# Patient Record
Sex: MALE | Race: OTHER | NOT HISPANIC OR LATINO
[De-identification: names, ages, dates, MRNs, and addresses within clinical notes are randomized per-mention and may not be internally consistent; named-entity substitution may affect disease eponyms.]

---

## 2017-04-17 PROBLEM — Z00.00 ENCOUNTER FOR PREVENTIVE HEALTH EXAMINATION: Status: ACTIVE | Noted: 2017-04-17

## 2017-04-18 ENCOUNTER — APPOINTMENT (OUTPATIENT)
Dept: OTOLARYNGOLOGY | Facility: CLINIC | Age: 27
End: 2017-04-18

## 2017-04-18 VITALS
SYSTOLIC BLOOD PRESSURE: 128 MMHG | HEIGHT: 68 IN | BODY MASS INDEX: 32.58 KG/M2 | DIASTOLIC BLOOD PRESSURE: 84 MMHG | WEIGHT: 215 LBS | HEART RATE: 60 BPM

## 2017-04-18 DIAGNOSIS — R22.1 LOCALIZED SWELLING, MASS AND LUMP, NECK: ICD-10-CM

## 2017-04-18 DIAGNOSIS — Z78.9 OTHER SPECIFIED HEALTH STATUS: ICD-10-CM

## 2017-04-18 DIAGNOSIS — K11.20 SIALOADENITIS, UNSPECIFIED: ICD-10-CM

## 2017-04-20 ENCOUNTER — APPOINTMENT (OUTPATIENT)
Dept: ULTRASOUND IMAGING | Facility: CLINIC | Age: 27
End: 2017-04-20

## 2017-04-20 ENCOUNTER — OUTPATIENT (OUTPATIENT)
Dept: OUTPATIENT SERVICES | Facility: HOSPITAL | Age: 27
LOS: 1 days | End: 2017-04-20
Payer: COMMERCIAL

## 2017-04-20 DIAGNOSIS — K11.20 SIALOADENITIS, UNSPECIFIED: ICD-10-CM

## 2017-04-20 PROCEDURE — 76536 US EXAM OF HEAD AND NECK: CPT

## 2017-05-08 ENCOUNTER — RESULT REVIEW (OUTPATIENT)
Age: 27
End: 2017-05-08

## 2020-11-01 ENCOUNTER — EMERGENCY (EMERGENCY)
Facility: HOSPITAL | Age: 30
LOS: 1 days | Discharge: HOME | End: 2020-11-01
Attending: EMERGENCY MEDICINE
Payer: COMMERCIAL

## 2020-11-01 VITALS
RESPIRATION RATE: 20 BRPM | OXYGEN SATURATION: 98 % | SYSTOLIC BLOOD PRESSURE: 136 MMHG | TEMPERATURE: 98 F | HEART RATE: 88 BPM | DIASTOLIC BLOOD PRESSURE: 89 MMHG

## 2020-11-01 VITALS
DIASTOLIC BLOOD PRESSURE: 87 MMHG | TEMPERATURE: 99 F | WEIGHT: 205.03 LBS | SYSTOLIC BLOOD PRESSURE: 150 MMHG | OXYGEN SATURATION: 97 % | HEIGHT: 68 IN | HEART RATE: 97 BPM | RESPIRATION RATE: 20 BRPM

## 2020-11-01 LAB
ALBUMIN SERPL ELPH-MCNC: 4.7 G/DL — SIGNIFICANT CHANGE UP (ref 3.3–5)
ALP SERPL-CCNC: 59 U/L — SIGNIFICANT CHANGE UP (ref 40–120)
ALT FLD-CCNC: 19 U/L — SIGNIFICANT CHANGE UP (ref 10–45)
ANION GAP SERPL CALC-SCNC: 11 MMOL/L — SIGNIFICANT CHANGE UP (ref 5–17)
AST SERPL-CCNC: 15 U/L — SIGNIFICANT CHANGE UP (ref 10–40)
BASOPHILS # BLD AUTO: 0.02 K/UL — SIGNIFICANT CHANGE UP (ref 0–0.2)
BASOPHILS NFR BLD AUTO: 0.3 % — SIGNIFICANT CHANGE UP (ref 0–2)
BILIRUB SERPL-MCNC: 0.7 MG/DL — SIGNIFICANT CHANGE UP (ref 0.2–1.2)
BUN SERPL-MCNC: 9 MG/DL — SIGNIFICANT CHANGE UP (ref 7–23)
CALCIUM SERPL-MCNC: 9.6 MG/DL — SIGNIFICANT CHANGE UP (ref 8.4–10.5)
CHLORIDE SERPL-SCNC: 101 MMOL/L — SIGNIFICANT CHANGE UP (ref 96–108)
CO2 SERPL-SCNC: 27 MMOL/L — SIGNIFICANT CHANGE UP (ref 22–31)
CREAT SERPL-MCNC: 0.91 MG/DL — SIGNIFICANT CHANGE UP (ref 0.5–1.3)
EOSINOPHIL # BLD AUTO: 0.03 K/UL — SIGNIFICANT CHANGE UP (ref 0–0.5)
EOSINOPHIL NFR BLD AUTO: 0.4 % — SIGNIFICANT CHANGE UP (ref 0–6)
GLUCOSE SERPL-MCNC: 103 MG/DL — HIGH (ref 70–99)
HCT VFR BLD CALC: 48.3 % — SIGNIFICANT CHANGE UP (ref 39–50)
HGB BLD-MCNC: 16.9 G/DL — SIGNIFICANT CHANGE UP (ref 13–17)
IMM GRANULOCYTES NFR BLD AUTO: 0.3 % — SIGNIFICANT CHANGE UP (ref 0–1.5)
LIDOCAIN IGE QN: 12 U/L — SIGNIFICANT CHANGE UP (ref 7–60)
LYMPHOCYTES # BLD AUTO: 0.97 K/UL — LOW (ref 1–3.3)
LYMPHOCYTES # BLD AUTO: 14.4 % — SIGNIFICANT CHANGE UP (ref 13–44)
MCHC RBC-ENTMCNC: 30.5 PG — SIGNIFICANT CHANGE UP (ref 27–34)
MCHC RBC-ENTMCNC: 35 GM/DL — SIGNIFICANT CHANGE UP (ref 32–36)
MCV RBC AUTO: 87.2 FL — SIGNIFICANT CHANGE UP (ref 80–100)
MONOCYTES # BLD AUTO: 0.87 K/UL — SIGNIFICANT CHANGE UP (ref 0–0.9)
MONOCYTES NFR BLD AUTO: 12.9 % — SIGNIFICANT CHANGE UP (ref 2–14)
NEUTROPHILS # BLD AUTO: 4.81 K/UL — SIGNIFICANT CHANGE UP (ref 1.8–7.4)
NEUTROPHILS NFR BLD AUTO: 71.7 % — SIGNIFICANT CHANGE UP (ref 43–77)
NRBC # BLD: 0 /100 WBCS — SIGNIFICANT CHANGE UP (ref 0–0)
PLATELET # BLD AUTO: 234 K/UL — SIGNIFICANT CHANGE UP (ref 150–400)
POTASSIUM SERPL-MCNC: 3.8 MMOL/L — SIGNIFICANT CHANGE UP (ref 3.5–5.3)
POTASSIUM SERPL-SCNC: 3.8 MMOL/L — SIGNIFICANT CHANGE UP (ref 3.5–5.3)
PROT SERPL-MCNC: 7 G/DL — SIGNIFICANT CHANGE UP (ref 6–8.3)
RBC # BLD: 5.54 M/UL — SIGNIFICANT CHANGE UP (ref 4.2–5.8)
RBC # FLD: 12.3 % — SIGNIFICANT CHANGE UP (ref 10.3–14.5)
SODIUM SERPL-SCNC: 139 MMOL/L — SIGNIFICANT CHANGE UP (ref 135–145)
WBC # BLD: 6.72 K/UL — SIGNIFICANT CHANGE UP (ref 3.8–10.5)
WBC # FLD AUTO: 6.72 K/UL — SIGNIFICANT CHANGE UP (ref 3.8–10.5)

## 2020-11-01 PROCEDURE — 99284 EMERGENCY DEPT VISIT MOD MDM: CPT | Mod: 25

## 2020-11-01 PROCEDURE — 76705 ECHO EXAM OF ABDOMEN: CPT

## 2020-11-01 PROCEDURE — 76705 ECHO EXAM OF ABDOMEN: CPT | Mod: 26,RT

## 2020-11-01 PROCEDURE — 99285 EMERGENCY DEPT VISIT HI MDM: CPT

## 2020-11-01 PROCEDURE — 80053 COMPREHEN METABOLIC PANEL: CPT

## 2020-11-01 PROCEDURE — 96374 THER/PROPH/DIAG INJ IV PUSH: CPT

## 2020-11-01 PROCEDURE — 85025 COMPLETE CBC W/AUTO DIFF WBC: CPT

## 2020-11-01 PROCEDURE — 83690 ASSAY OF LIPASE: CPT

## 2020-11-01 RX ORDER — ACETAMINOPHEN 500 MG
650 TABLET ORAL ONCE
Refills: 0 | Status: COMPLETED | OUTPATIENT
Start: 2020-11-01 | End: 2020-11-01

## 2020-11-01 RX ORDER — FAMOTIDINE 10 MG/ML
20 INJECTION INTRAVENOUS ONCE
Refills: 0 | Status: COMPLETED | OUTPATIENT
Start: 2020-11-01 | End: 2020-11-01

## 2020-11-01 RX ORDER — SODIUM CHLORIDE 9 MG/ML
1000 INJECTION INTRAMUSCULAR; INTRAVENOUS; SUBCUTANEOUS ONCE
Refills: 0 | Status: COMPLETED | OUTPATIENT
Start: 2020-11-01 | End: 2020-11-01

## 2020-11-01 RX ADMIN — Medication 650 MILLIGRAM(S): at 22:40

## 2020-11-01 RX ADMIN — Medication 20 MILLIGRAM(S): at 22:44

## 2020-11-01 RX ADMIN — FAMOTIDINE 20 MILLIGRAM(S): 10 INJECTION INTRAVENOUS at 20:01

## 2020-11-01 RX ADMIN — Medication 650 MILLIGRAM(S): at 20:01

## 2020-11-01 RX ADMIN — SODIUM CHLORIDE 1000 MILLILITER(S): 9 INJECTION INTRAMUSCULAR; INTRAVENOUS; SUBCUTANEOUS at 20:01

## 2020-11-01 NOTE — ED PROVIDER NOTE - PATIENT PORTAL LINK FT
You can access the FollowMyHealth Patient Portal offered by [facility] by registering at the following website: [email or web address]. By joining [facility] FollowMyHealth portal, you will also be able to view your health information using other applications (apps) compatible with our system.

## 2020-11-01 NOTE — ED PROVIDER NOTE - NSFOLLOWUPINSTRUCTIONS_ED_ALL_ED_FT
Thank you for visiting our Emergency Department, it has been a pleasure taking part in your healthcare.    Please follow up with your Primary Doctor in 2-3 days.    Tylenol 650mg every 4 hours as needed for pain. Pepcid 20mg once a day.      Abdominal Pain  Many things can cause abdominal pain. Many times, abdominal pain is not caused by a disease and will improve without treatment. Your health care provider will do a physical exam to determine if there is a dangerous cause of your pain; blood tests and imaging may help determine the cause of your pain. However, in many cases, no cause may be found and you may need further testing as an outpatient. Monitor your abdominal pain for any changes.     SEEK IMMEDIATE MEDICAL CARE IF YOU HAVE ANY OF THE FOLLOWING SYMPTOMS: worsening abdominal pain, uncontrollable vomiting, profuse diarrhea, inability to have bowel movements or pass gas, black or bloody stools, fever accompanying chest pain or back pain, or fainting. These symptoms may represent a serious problem that is an emergency. Do not wait to see if the symptoms will go away. Get medical help right away. Call 911 and do not drive yourself to the hospital.

## 2020-11-01 NOTE — ED PROVIDER NOTE - CLINICAL SUMMARY MEDICAL DECISION MAKING FREE TEXT BOX
Resident: Kimberley Flores - 31 yo healthy male c/o epigastric abdominal pain most likely from viral/food-borne gastroenteritis. Other ddx to consider for this patient include pancreatitis vs cholecystitis given epigastric TTP. Labs, IVF, RUQ ultrasound, pain control with tylenol and pepcid. Anticipate d/c home if labs and imaging nl. Resident: Kimberley Flores - 29 yo healthy male c/o epigastric abdominal pain most likely from viral/food-borne gastroenteritis. Other ddx to consider for this patient include pancreatitis vs cholecystitis given epigastric TTP. Labs, IVF, RUQ ultrasound, pain control with tylenol and pepcid. Anticipate d/c home if labs and imaging nl.    Hilda Shaw MD - Attending Physician: Pt ehre with 2 days of epigastric pain, bloating, diarrhea, chills. No fever. Well appearing. Exam benign, though mildly tender. Likely AGE vs GI cramping. Low concern for GB or pancreatitis, but will check labs/US. Meds for symptoms Resident: Kimberley Flores - 29 yo healthy male c/o epigastric abdominal pain most likely from viral/food-borne gastroenteritis. Other ddx to consider for this patient include pancreatitis vs cholecystitis given epigastric TTP. Labs, IVF, RUQ ultrasound, pain control with tylenol and pepcid. Anticipate d/c home if labs and imaging nl.    Hilda Shaw MD - Attending Physician: Pt here with 2 days of epigastric pain, bloating, diarrhea, chills. No fever. Well appearing. Exam benign, though mildly tender. Likely AGE vs GI cramping. Low concern for GB or pancreatitis, but will check labs/US. Meds for symptoms

## 2020-11-01 NOTE — ED PROVIDER NOTE - OBJECTIVE STATEMENT
31 yo M hx asthma, c/o epigastric abdominal pain after eating chinese food. Pain started yesterday PM with gradual onset, described as tightness and rated 6/10. Patient had chinese food 2 days ago, the next day he started having loss of appetite and c/o chills. Took his temperature, did not have a fever. He tried taking tylenol yesterday with no relief of his pain, did not take any today. Denies any n/v. Had 1x episode diarrhea today. Feels bloated. Denies any hx abdominal surgeries. Denies SOB, c/p, dysuria, urinary retention. Drinks alcohol socially, has not had a drink in a couple of weeks. 29 yo M hx asthma, c/o epigastric abdominal pain after eating chinese food. Pain started yesterday PM with gradual onset, described as tightness and rated 6/10. Patient had chinese food 2 days ago, the next day he started having loss of appetite and c/o chills. Took his temperature, did not have a fever. He tried taking tylenol yesterday with no relief of his pain, did not take any today. Denies any n/v. Had 1x episode diarrhea today. Feels bloated. Denies any hx abdominal surgeries. No recent abx use. Denies SOB, c/p, dysuria, urinary retention. Drinks alcohol socially, has not had a drink in a couple of weeks.

## 2020-11-01 NOTE — ED PROVIDER NOTE - PROGRESS NOTE DETAILS
US reviewed with Rads. No GB wall thickening, normal CBD, neg [place]. Pt remains well, abd benign, labs unremarkable. Will Dc, supportive care at home. F/u with pmd. Return precautions discussed Sandra, PGY1 – Pt was re-evaluated at bedside, VSS, feeling well overall. Return precautions and follow up plan with PCP and/or specialist were discussed. Time was taken to answer any questions that the patient had before providing them with discharge paperwork.

## 2020-11-01 NOTE — ED ADULT NURSE NOTE - OBJECTIVE STATEMENT
PT 30 year old male, AO x3. PT came in through triage due to abdominal pain. PMH- Asthma. Denies PSH. PT states two days ago he ate chinese food. PT states since yesterday, he developed epigastric pain and abdominal distension. PT also complaining of diarrhea, back chills and decrease PO intake due to abdominal discomfort. PT describes pain as twisting and states it increases when sitting or "hunched over". PT stated he ate nothing today and tolerate fluids. Abd. firm, distended, tender. Skin- warm, dry, intact. Ambulates independently. Denies SOB, chest pain, fever, N/V, dysuria, dizziness/ lightheadedness, numbness/ tingling, cough. Interventions- side rails up, call bell at bedside. IV- 18 RT AC. PT 30 year old male, AO x3. PT came in through triage due to abdominal pain. PMH- Asthma. Denies PSH. PT states two days ago he ate chinese food. PT states since yesterday, he developed epigastric pain and abdominal distension. PT also complaining of diarrhea, back chills and decrease PO intake due to abdominal discomfort. PT describes pain as twisting and states it increases when sitting or "hunched over". PT stated he had 2-3 episodes of watery stools, ate nothing today but able to tolerate fluids. Abd. firm, distended, tender. Skin- warm, dry, intact. Ambulates independently. Denies SOB, chest pain, fever, N/V, dysuria, dizziness/ lightheadedness, numbness/ tingling, cough. Interventions- side rails up, call bell at bedside. IV- 18 RT AC.

## 2020-11-01 NOTE — ED PROVIDER NOTE - PHYSICAL EXAMINATION
Gen: NAD; well appearing  Head: NCAT  Eyes: EOMI, PERRLA, no conjunctival pallor, no scleral icterus  ENT: mucous membranes moist, no discharge  Neck: neck supple  Resp: CTAB, no W/R/R  CV: RRR, +S1/S2, no M/R/G  GI: Abdomen soft, mild distension. TTP epigastric   MSK: No open wounds, no bruising, no lower extremity edema  Neuro: A&Ox4, sensation nl, motor 5/5 RUE/LUE/RLE/LLE, follows commands  Ext: no edema, no deformity, warm and well-perfused  Skin: no rash or bruising Gen: NAD; well appearing  Head: NCAT  Eyes: EOMI, PERRLA, no conjunctival pallor, no scleral icterus  ENT: mucous membranes moist, no discharge  Neck: neck supple  Resp: CTAB, no W/R/R  CV: RRR, +S1/S2, no M/R/G  GI: Abdomen soft, nondistended. mild distractible TTP epigastrium no rebound/guarding  MSK: No open wounds, no bruising, no lower extremity edema  Neuro: A&Ox4, sensation nl, motor 5/5 RUE/LUE/RLE/LLE, follows commands  Ext: no edema, no deformity, warm and well-perfused  Skin: no rash or bruising

## 2021-06-09 ENCOUNTER — APPOINTMENT (OUTPATIENT)
Dept: DISASTER EMERGENCY | Facility: CLINIC | Age: 31
End: 2021-06-09

## 2021-06-09 DIAGNOSIS — Z01.818 ENCOUNTER FOR OTHER PREPROCEDURAL EXAMINATION: ICD-10-CM

## 2021-06-09 PROBLEM — Z00.00 ENCOUNTER FOR PREVENTIVE HEALTH EXAMINATION: Noted: 2021-06-09

## 2021-06-10 LAB — SARS-COV-2 N GENE NPH QL NAA+PROBE: NOT DETECTED

## 2021-08-16 PROBLEM — J45.909 UNSPECIFIED ASTHMA, UNCOMPLICATED: Chronic | Status: ACTIVE | Noted: 2020-11-01

## 2023-07-05 ENCOUNTER — EMERGENCY (EMERGENCY)
Facility: HOSPITAL | Age: 33
LOS: 1 days | Discharge: HOME | End: 2023-07-05
Admitting: EMERGENCY MEDICINE
Payer: OTHER MISCELLANEOUS

## 2023-07-05 VITALS
SYSTOLIC BLOOD PRESSURE: 172 MMHG | DIASTOLIC BLOOD PRESSURE: 115 MMHG | HEART RATE: 95 BPM | WEIGHT: 210.1 LBS | TEMPERATURE: 98 F | OXYGEN SATURATION: 98 % | RESPIRATION RATE: 16 BRPM

## 2023-07-05 VITALS
DIASTOLIC BLOOD PRESSURE: 88 MMHG | OXYGEN SATURATION: 99 % | HEART RATE: 88 BPM | RESPIRATION RATE: 16 BRPM | TEMPERATURE: 98 F | SYSTOLIC BLOOD PRESSURE: 144 MMHG

## 2023-07-05 PROCEDURE — 99053 MED SERV 10PM-8AM 24 HR FAC: CPT

## 2023-07-05 PROCEDURE — 99284 EMERGENCY DEPT VISIT MOD MDM: CPT

## 2023-07-05 RX ORDER — MOXIFLOXACIN HCL 0.5 %
1 DROPS OPHTHALMIC (EYE)
Qty: 1 | Refills: 0
Start: 2023-07-05 | End: 2023-07-11

## 2023-07-05 RX ORDER — ACETAMINOPHEN 500 MG
975 TABLET ORAL ONCE
Refills: 0 | Status: COMPLETED | OUTPATIENT
Start: 2023-07-05 | End: 2023-07-05

## 2023-07-05 RX ORDER — IBUPROFEN 200 MG
800 TABLET ORAL ONCE
Refills: 0 | Status: COMPLETED | OUTPATIENT
Start: 2023-07-05 | End: 2023-07-05

## 2023-07-05 RX ORDER — IBUPROFEN 200 MG
1 TABLET ORAL
Qty: 20 | Refills: 0
Start: 2023-07-05

## 2023-07-05 RX ORDER — TETANUS TOXOID, REDUCED DIPHTHERIA TOXOID AND ACELLULAR PERTUSSIS VACCINE, ADSORBED 5; 2.5; 8; 8; 2.5 [IU]/.5ML; [IU]/.5ML; UG/.5ML; UG/.5ML; UG/.5ML
0.5 SUSPENSION INTRAMUSCULAR ONCE
Refills: 0 | Status: COMPLETED | OUTPATIENT
Start: 2023-07-05 | End: 2023-07-05

## 2023-07-05 RX ORDER — MOXIFLOXACIN HCL 0.5 %
1 DROPS OPHTHALMIC (EYE) ONCE
Refills: 0 | Status: COMPLETED | OUTPATIENT
Start: 2023-07-05 | End: 2023-07-05

## 2023-07-05 RX ADMIN — Medication 800 MILLIGRAM(S): at 03:10

## 2023-07-05 RX ADMIN — TETANUS TOXOID, REDUCED DIPHTHERIA TOXOID AND ACELLULAR PERTUSSIS VACCINE, ADSORBED 0.5 MILLILITER(S): 5; 2.5; 8; 8; 2.5 SUSPENSION INTRAMUSCULAR at 03:11

## 2023-07-05 RX ADMIN — Medication 1 DROP(S): at 03:19

## 2023-07-05 RX ADMIN — Medication 975 MILLIGRAM(S): at 03:10

## 2023-07-05 NOTE — ED PROVIDER NOTE - NSFOLLOWUPCLINICS_GEN_ALL_ED_FT
[facility]  Ophthalmology  [address] [place] [id number]  Phone: [phone]  Fax:     [facility]  ENT  [address] [place] [address]  Phone: [phone]  Fax: [phone]
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
## 2023-07-05 NOTE — ED PROVIDER NOTE - PHYSICAL EXAMINATION
Gen - WDWN, NAD, comfortable and non-toxic appearing  Skin - warm, dry, intact   HEENT - AT/NC, +mild conjunctival injection with clear dc, no nasal discharge, airway patent, TM intact with no fluids or hemotympanium b/l, no mastoid tenderness, neck supple and FROM  Eye - PERRL, EOMI, mild conjunctival injection, no active d/c or bleeding, no FB noted, +florescein uptake noted in 7-8 o'clock direction, no dendritic pattern or rust ring noted, negative coleen sign, visual fields intact  vision - 20/30 OD. 20/20 OS, 20/20 OU without correction. pH 7.0 OU   CV - S1S2, R/R/R  Resp - CTAB, no r/r/w  GI - soft, ND, NT, no CVAT b/l   MS - No acute or gross deformities noted to extremities. No midline spinal tenderness or step off on palpation  Neuro - AxOx3, no focal neuro deficits, ambulatory without gait disturbance

## 2023-07-05 NOTE — ED ADULT NURSE NOTE - NSFALLUNIVINTERV_ED_ALL_ED
Bed/Stretcher in lowest position, wheels locked, appropriate side rails in place/Call bell, personal items and telephone in reach/Instruct patient to call for assistance before getting out of bed/chair/stretcher/Non-slip footwear applied when patient is off stretcher/[place] to call system/Physically safe environment - no spills, clutter or unnecessary equipment/Purposeful proactive rounding/Room/bathroom lighting operational, light cord in reach

## 2023-07-05 NOTE — ED PROVIDER NOTE - NSFOLLOWUPINSTRUCTIONS_ED_ALL_ED_FT
Chemical Conjunctivitis, Adult    Chemical conjunctivitis, or chemical pink eye, is inflammation of the conjunctiva caused by a chemical irritant. The conjunctiva is the clear covering of the white part of the eye and the inner eyelid. The condition makes the eye red, pink, and itchy.    This condition can occur in one eye or both eyes. It cannot spread from person to person (is not contagious).    Certain chemicals, especially strong acids or bases, can cause severe damage to the eye, occasionally leading to blindness in the eye.    What are the causes?  This condition is caused by a chemical or other irritant getting in your eye, such as:  Smoke.  Chlorine.  Soap.  Fumes.  Air pollution.  What increases the risk?  The following factors may make you more likely to develop this condition:  Living in a place with high levels of air pollution.  Working in an environment with chemicals in the air.  Using swimming pools often.  Wearing contact lenses.  What are the signs or symptoms?  Symptoms of this condition include:  Eye redness.  Itchy eyes.  Burning feeling in the eyes.  Eye pain.  Clear drainage from the eyes.  Swollen eyelids.  Sensitivity to light.  How is this diagnosed?  This condition is diagnosed based on your symptoms, your medical history, and a physical exam. During the exam, your health care provider may use a medical instrument that uses magnified light (slit lamp) to examine your eyes. If you have drainage in your eyes, it may be tested to rule out other causes.    How is this treated?    Treatment for this condition involves carefully rinsing (flushing) the chemical out of your eye. The sooner this is done, the better. Treatment may also include:  Artificial tears to help wash out any remaining chemical.  Steroid eye drops to ease inflammation.  Antibiotic medicine to prevent infection.  Applying cool, wet cloths (cool compresses) to ease discomfort and inflammation.  Over-the-counter pain medicines to ease discomfort.  Follow these instructions at home:  Medicines    Take or apply over-the-counter and prescription medicines only as told by your health care provider.  If you were prescribed an antibiotic medicine, take or apply it as told by your health care provider. Do not stop using the antibiotic even if you start to feel better.  Use eye drops and ointments as told by your health care provider.  General instructions    Until the inflammation is gone or for as long as directed by your health care provider:  Do not wear contact lenses. Wear glasses instead.  Do not wear eye makeup.  Do not touch or rub your eyes.  Apply a clean cool compress to your eye for 10–20 minutes, 3–4 times a day as needed for comfort.  Avoid being around the chemical or environment that caused the irritation. Wear eye protection as necessary.  Wash your hands with soap and water for at least 20 seconds before you apply eye drops or cool compresses to your eyes. If soap and water are not available, use hand .  Keep all follow-up visits. This is important.  Contact a health care provider if:  Your symptoms do not improve or they get worse.  You have new symptoms.  Your pain gets worse.  You have pus draining from your eye.  Get help right away if:  Your vision suddenly gets worse.  Summary  Chemical conjunctivitis, or chemical pink eye, is inflammation of the conjunctiva caused by a chemical irritant. The conjunctiva is the clear covering of the white part of the eye and the inner eyelid.  This condition is diagnosed based on your symptoms, your medical history, and a physical exam.  Initial treatment for this condition involves carefully rinsing (flushing) the chemical out of your eye. You may be prescribed eye drops.  Until the inflammation is gone, do not wear contact lenses or eye makeup.    Corneal Abrasion    A corneal abrasion is a scratch or injury to the clear covering over the front of the eye (cornea). Your cornea forms a clear dome that protects your eye and helps to focus your vision. Your cornea is made up of many layers, but the surface layer is one of the most sensitive tissues in your body. A corneal abrasion can be very painful.    If a corneal abrasion is not treated, it can become infected and cause an ulcer. This can lead to scarring. A scarred cornea can affect your vision. Sometimes abrasions come back in the same area, even after the original injury has healed.    What are the causes?  This condition may be caused by:  A poke in the eye.  A gritty or irritating substance (foreign body) in the eye.  Excessive eye rubbing.  Very dry eyes.  Certain eye infections.  Contact lenses that fit poorly or are worn for a long period of time. You can also injure your cornea when putting contact lenses in your eye or taking them out.  Eye surgery.  Certain cornea problems may increase the chance of a corneal abrasion.  Sometimes, the cause is not known.    What are the signs or symptoms?  Symptoms of this condition include:  Eye pain. The pain may get worse when you open and close your eye or when you move your eye.  A feeling of something stuck in your eye.  Tearing, redness, and sensitivity to light.  Having trouble keeping your eye open, or not being able to keep it open.  Blurred vision.  Headache.  How is this diagnosed?  You may work with a health care provider who specializes in diseases and conditions of the eye (ophthalmologist). This condition may be diagnosed based on your medical history, symptoms, and an eye exam.    Before the eye exam, numbing drops may be put into your eye. You may also have dye put in your eye with a dropper or a small paper strip. The dye makes the abrasion easy to see when your ophthalmologist examines your eye with a light. Your ophthalmologist may look at your eye through an eye scope (slit lamp).    How is this treated?  Treatment may vary depending on the cause of your condition, and it may include:  Washing out your eye.  Removing any foreign bodies that are in your eye.  Using antibiotic drops or ointment to treat or prevent an infection.  Using a dilating drop to decrease inflammation and pain.  Using steroid drops or ointment to treat redness, irritation, or inflammation.  Applying a cold, wet cloth (cold compress) or ice pack to ease the pain.  Taking pain medicine by mouth (orally).  In some cases, an eye patch or bandage soft contact lens might also be used. An eye patch should not be used if the corneal abrasion was related to contact lens wear as it can increase the chance of infection in these eyes.    Follow these instructions at home:  Medicines    Use eye drops or ointments as told by your health care provider.  If you were prescribed antibiotic drops or ointment, use them as told by your health care provider. Do not stop using the antibiotic even if you start to feel better.  Take over-the-counter and prescription medicines only as told by your health care provider.  Ask your health care provider if the medicine prescribed to you:  Requires you to avoid driving or using heavy machinery.  Can cause constipation. You may need to take these actions to prevent or treat constipation:  Drink enough fluid to keep your urine pale yellow.  Take over-the-counter or prescription medicines.  Eat foods that are high in fiber, such as beans, whole grains, and fresh fruits and vegetables.  Limit foods that are high in fat and processed sugars, such as fried or sweet foods.  Eye patch use    If you have an eye patch, wear it as told by your health care provider.  Do not drive or use machinery while wearing an eye patch. Your ability to  distances will be impaired.  Follow instructions from your health care provider about when to remove the patch.  General instructions    Ask your health care provider whether you can use a cold compress on your eye to relieve pain.  Do not rub or touch your eye. Do not wash out your eye.  Do not wear contact lenses until your health care provider says that this is okay.  Avoid bright light and eye strain.  Keep all follow-up visits as told by your health care provider. This is important for preventing infection and vision loss.  Contact a health care provider if:  You continue to have eye pain and other symptoms for more than 2 days.  You have new symptoms, such as worse redness, tearing, or discharge.  You have discharge that makes your eyelids stick together in the morning.  Your eye patch becomes so loose that you can blink your eye.  Symptoms return after the original abrasion has healed.  Get help right away if:  You have severe eye pain that does not get better with medicine.  You have vision loss.  Summary  A corneal abrasion is a scratch or injury to the clear covering over the front of the eye (cornea).  It is important to get treatment for a corneal abrasion. If this problem is not treated, it can affect your vision.  Use eye drops or ointments as told by your health care provider.  If you have an eye patch, do not drive or use machinery while wearing it. Your ability to  distances will be impaired.  Let your health care provider know if your symptoms continue for more than 2 days.

## 2023-07-05 NOTE — ED PROVIDER NOTE - PATIENT PORTAL LINK FT
You can access the FollowMyHealth Patient Portal offered by [facility] by registering at the following website: [email or web address]. By joining [facility]’s FollowMyHealth portal, you will also be able to view your health information using other applications (apps) compatible with our system.
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
## 2023-07-05 NOTE — ED PROVIDER NOTE - BIRTH SEX
Problem: Patient Care Overview  Goal: Plan of Care Review  Outcome: Ongoing (interventions implemented as appropriate)  Patient aware of plan of care. VS stable, afebrile. Patient didn't rest well. C/o left arm and hip pain, moderate relief with Dilaudid. Patient able to get on fracture pan with assistance, tolerated well. Voided without difficulty. 2L O2 per NC in use, no distress noted. Free from falls/injuries. No questions or concerns at this time. Agrees with plan of care.        Male
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
## 2023-07-05 NOTE — ED PROVIDER NOTE - CARE PROVIDER_API CALL
Joel Gaxiola  Otolaryngology  [address], [address] [place] [address]  Phone: [phone]  Fax: [phone]  Follow Up Time:     Emile Loza  Ophthalmology  [address] [place] [address]  Phone: [phone]  Fax: [phone]  Follow Up Time:
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
## 2023-07-07 DIAGNOSIS — H93.13 TINNITUS, BILATERAL: ICD-10-CM

## 2023-07-07 DIAGNOSIS — Z23 ENCOUNTER FOR IMMUNIZATION: ICD-10-CM

## 2023-07-07 DIAGNOSIS — H57.11 OCULAR PAIN, RIGHT EYE: ICD-10-CM

## 2023-07-07 DIAGNOSIS — S60.412A ABRASION OF RIGHT MIDDLE FINGER, INITIAL ENCOUNTER: ICD-10-CM

## 2023-07-07 DIAGNOSIS — H10.211 ACUTE TOXIC CONJUNCTIVITIS, RIGHT EYE: ICD-10-CM

## 2023-07-07 DIAGNOSIS — Z87.09 PERSONAL HISTORY OF OTHER DISEASES OF THE RESPIRATORY SYSTEM: ICD-10-CM
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
